# Patient Record
Sex: FEMALE | Race: WHITE | URBAN - METROPOLITAN AREA
[De-identification: names, ages, dates, MRNs, and addresses within clinical notes are randomized per-mention and may not be internally consistent; named-entity substitution may affect disease eponyms.]

---

## 2018-07-17 ENCOUNTER — OFFICE VISIT (OUTPATIENT)
Dept: CONSULT | Facility: CLINIC | Age: 43
End: 2018-07-17
Attending: GENETIC COUNSELOR, MS
Payer: COMMERCIAL

## 2018-07-17 ENCOUNTER — APPOINTMENT (OUTPATIENT)
Dept: LAB | Facility: CLINIC | Age: 43
End: 2018-07-17
Attending: GENETIC COUNSELOR, MS
Payer: COMMERCIAL

## 2018-07-17 DIAGNOSIS — Z13.71 TESTING OF FEMALE FOR GENETIC DISEASE CARRIER STATUS: Primary | ICD-10-CM

## 2018-07-17 PROCEDURE — 81403 MOPATH PROCEDURE LEVEL 4: CPT | Performed by: MEDICAL GENETICS

## 2018-07-17 PROCEDURE — 81479 UNLISTED MOLECULAR PATHOLOGY: CPT | Performed by: MEDICAL GENETICS

## 2018-07-17 PROCEDURE — 36415 COLL VENOUS BLD VENIPUNCTURE: CPT | Performed by: MEDICAL GENETICS

## 2018-07-17 PROCEDURE — 40000072 ZZH STATISTIC GENETIC COUNSELING, < 16 MIN: Mod: ZF | Performed by: GENETIC COUNSELOR, MS

## 2018-07-17 NOTE — LETTER
7/17/2018      RE: Marie Bang  [address]  [place] [address]       Presenting Information: Marie was seen for a brief genetic counseling visit to facilitate testing for a familial mutation in the MYBPC3 gene known to cause hypertrophic cardiomyopathy (HCM) in her family. Her son Nehemiah Bang (MRN [id number]) has been found to have this autosomal dominant mutation. Poly is here today to pursue testing to see if this was inherited by Nehemiah from her side of the family.      Family History:  A three generation pedigree was obtained today and scanned into the EMR.        Discussion: We reviewed the genetics and inheritance of the MYBPC3 gene. We each have two copies of this gene. If an individual has one altered copy (or one copy with a mutation) then they are at risk to develop HCM. This is an autosomal dominant gene: an affected person has a 50% chance to pass on the altered gene to their children. It is likely that Poly's son inherited this gene from either her or her . She would like to pursue targeted testing for the [id number] mutation identified in her son. If Poly has this mutation, she will need cardiology follow up. If she does not have this mutation, then she is not at increased risk for HCM.    Consent was obtained and blood was drawn and sent to [facility]. Results should be complete in about 2-4 weeks and I will call the patient directly.      Plan:  1. A three generation pedigree was obtained and scanned into the EMR.  2. The genetics and inheritance of MYBPC3-related HCM were reviewed today.  Blood sent for targeted gene test.  3. Contact information was provided to the family and additional questions or concerns were denied.    Brianna Varner GC  Certified Genetic Counselor    Approximate Time Spent in Consultation:15 minutes    CC: No Letter        Brianna Varner GC

## 2018-07-17 NOTE — PROGRESS NOTES
Presenting Information: Marie was seen for a brief genetic counseling visit to facilitate testing for a familial mutation in the MYBPC3 gene known to cause hypertrophic cardiomyopathy (HCM) in her family. Her son Nehemiah Bang (MRN [id number]) has been found to have this autosomal dominant mutation. Poly is here today to pursue testing to see if this was inherited by Nehemiah from her side of the family.      Family History:  A three generation pedigree was obtained today and scanned into the EMR.        Discussion: We reviewed the genetics and inheritance of the MYBPC3 gene. We each have two copies of this gene. If an individual has one altered copy (or one copy with a mutation) then they are at risk to develop HCM. This is an autosomal dominant gene: an affected person has a 50% chance to pass on the altered gene to their children. It is likely that Poly's son inherited this gene from either her or her . She would like to pursue targeted testing for the [id number] mutation identified in her son. If Poly has this mutation, she will need cardiology follow up. If she does not have this mutation, then she is not at increased risk for HCM.    Consent was obtained and blood was drawn and sent to the [facility] lab. Results should be complete in about 2-4 weeks and I will call the patient directly.      Plan:  1. A three generation pedigree was obtained and scanned into the EMR.  2. The genetics and inheritance of MYBPC3-related HCM were reviewed today.  Blood sent for targeted gene test.  3. Contact information was provided to the family and additional questions or concerns were denied.    Brianna Varner GC  Certified Genetic Counselor    Approximate Time Spent in Consultation:15 minutes    CC: No Letter

## 2018-07-17 NOTE — LETTER
Date:July 18, 2018      Provider requested that no letter be sent. Do not send.       [facility] Health Information

## 2018-07-17 NOTE — MR AVS SNAPSHOT
After Visit Summary   2018    Marie Bang    MRN: [id number]           Patient Information     Date Of Birth          1975        Visit Information        Provider Department      2018 1:00 PM Brianna Varner GC Peds Genetics        Today's Diagnoses     Testing of female for genetic disease carrier status    -  1       Follow-ups after your visit        Who to contact     Please call your clinic at [phone] to:    Ask questions about your health    Make or cancel appointments    Discuss your medicines    Learn about your test results    Speak to your doctor            Additional Information About Your Visit        MyChart Information     Junar is an electronic gateway that provides easy, online access to your medical records. With Junar, you can request a clinic appointment, read your test results, renew a prescription or communicate with your care team.     To sign up for [employer] visit the website at [email or web address]   You will be asked to enter the access code listed below, as well as some personal information. Please follow the directions to create your username and password.     Your access code is: [email or web address]  Expires: 10/15/2018  2:22 PM     Your access code will  in 90 days. If you need help or a new code, please contact your [facility] or call [phone] for assistance.        Care EveryWhere ID     This is your Care EveryWhere ID. This could be used by other organizations to access your [place] medical records  [id number]         Blood Pressure from Last 3 Encounters:   No data found for BP    Weight from Last 3 Encounters:   No data found for Wt              We Performed the Following     Next Generation Sequencing        Primary Care Provider Office Phone # Fax #    [facility] [phone] [address] [address]        Equal Access to Services      TATA NETTLES : Hadii aad darian radha Serna, wastephanieda luqadaha, qaybta kaalmada cheikhmichellesoledad, narda peoplesjenniferacacia khan. So [facility] [phone].    ATENCIÓN: Si habla español, tiene a byrne disposición servicios gratuitos de asistencia lingüística. Llame al [phone].    We comply with applicable federal civil rights laws and [place] laws. We do not discriminate on the basis of race, color, national origin, age, disability, sex, sexual orientation, or gender identity.            Thank you!     Thank you for choosing [facility]  for your care. Our goal is always to provide you with excellent care. Hearing back from our patients is one way we can continue to improve our services. Please take a few minutes to complete the written survey that you may receive in the mail after your visit with us. Thank you!             Your Updated Medication List - Protect others around you: Learn how to safely use, store and throw away your medicines at www.disposemymeds.org.      Notice  As of 7/17/2018  2:22 PM    You have not been prescribed any medications.

## 2018-07-27 LAB — COPATH REPORT: NORMAL

## 2018-07-31 ENCOUNTER — TELEPHONE (OUTPATIENT)
Dept: CONSULT | Facility: CLINIC | Age: 43
End: 2018-07-31

## 2018-08-09 ENCOUNTER — TELEPHONE (OUTPATIENT)
Dept: CONSULT | Facility: CLINIC | Age: 43
End: 2018-08-09

## 2018-08-09 NOTE — TELEPHONE ENCOUNTER
Left message for Poly. Asked her to call me to discuss test results.    Brianna Varner MS,[facility]  Licensed Genetic Counselor  [email or web address]  [phone]